# Patient Record
Sex: FEMALE | Race: WHITE | Employment: FULL TIME | URBAN - NONMETROPOLITAN AREA
[De-identification: names, ages, dates, MRNs, and addresses within clinical notes are randomized per-mention and may not be internally consistent; named-entity substitution may affect disease eponyms.]

---
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
## 2022-01-14 ENCOUNTER — HOSPITAL ENCOUNTER (EMERGENCY)
Age: 22
Discharge: HOME | End: 2022-01-14
Attending: FAMILY MEDICINE
Payer: MEDICARE

## 2022-01-14 VITALS
DIASTOLIC BLOOD PRESSURE: 81 MMHG | RESPIRATION RATE: 18 BRPM | SYSTOLIC BLOOD PRESSURE: 126 MMHG | BODY MASS INDEX: 35.34 KG/M2 | TEMPERATURE: 98.3 F | OXYGEN SATURATION: 100 % | HEART RATE: 98 BPM | WEIGHT: 180 LBS | HEIGHT: 60 IN

## 2022-01-14 DIAGNOSIS — U07.1 COVID-19 VIRUS INFECTION: Primary | ICD-10-CM

## 2022-01-14 LAB
FLU A ANTIGEN: NEGATIVE
FLU B ANTIGEN: NEGATIVE
SARS-COV-2, NAAT: DETECTED

## 2022-01-14 PROCEDURE — 87635 SARS-COV-2 COVID-19 AMP PRB: CPT

## 2022-01-14 PROCEDURE — 87804 INFLUENZA ASSAY W/OPTIC: CPT

## 2022-01-14 PROCEDURE — 99284 EMERGENCY DEPT VISIT MOD MDM: CPT

## 2022-01-14 PROCEDURE — 6370000000 HC RX 637 (ALT 250 FOR IP): Performed by: FAMILY MEDICINE

## 2022-01-14 RX ORDER — ONDANSETRON 4 MG/1
4 TABLET, ORALLY DISINTEGRATING ORAL 4 TIMES DAILY PRN
Qty: 20 TABLET | Refills: 0 | Status: SHIPPED | OUTPATIENT
Start: 2022-01-14 | End: 2022-06-06

## 2022-01-14 RX ORDER — ONDANSETRON 4 MG/1
4 TABLET, ORALLY DISINTEGRATING ORAL ONCE
Status: COMPLETED | OUTPATIENT
Start: 2022-01-14 | End: 2022-01-14

## 2022-01-14 RX ADMIN — ONDANSETRON 4 MG: 4 TABLET, ORALLY DISINTEGRATING ORAL at 03:30

## 2022-01-14 ASSESSMENT — PAIN DESCRIPTION - LOCATION: LOCATION: HEAD;THROAT

## 2022-01-14 ASSESSMENT — ENCOUNTER SYMPTOMS
DIARRHEA: 1
VOMITING: 0
ABDOMINAL PAIN: 0
COUGH: 0
SORE THROAT: 0
BACK PAIN: 1
NAUSEA: 1
SHORTNESS OF BREATH: 0
WHEEZING: 0

## 2022-01-14 ASSESSMENT — PAIN DESCRIPTION - PAIN TYPE: TYPE: ACUTE PAIN

## 2022-01-14 ASSESSMENT — PAIN SCALES - GENERAL: PAINLEVEL_OUTOF10: 5

## 2022-01-14 NOTE — Clinical Note
Ronald Barker was seen and treated in our emergency department on 1/14/2022. She may return to work on 01/19/2022. Return to work on 1/19/2022 if asymptomatic for Covid 19 infection but should wear a mask 1/24/2022. If symptoms persist, do not return to work until 1/24/2022. If you have any questions or concerns, please don't hesitate to call.       Talon Lynn MD

## 2022-01-14 NOTE — Clinical Note
Duane Perez was seen and treated in our emergency department on 1/14/2022. She may return to work on 01/19/2022. Return to work on 1/19/2022 if asymptomatic for Covid 19 infection but should wear a mask 1/24/2022. If symptoms persist, do not return to work until 1/24/2022. If you have any questions or concerns, please don't hesitate to call.       Key Weiss MD

## 2022-01-14 NOTE — ED PROVIDER NOTES
[address]/[facility] COMPLAINT       Chief Complaint   Patient presents with    Concern For COVID-19     Pt requests COVID testing, at 2130 1/13/21 pt began to feel \"sinus infection\" and reports a temp of 99.6, wife instructed pt to come to the ED for testing       Nurses Notes reviewed and I agree except as noted in the HPI. HISTORY OF PRESENT ILLNESS    Yovani Lew is a 25 y.o. female who presents for evaluation of possible COVID-19 infection. Patient states that she developed diarrhea 2 days ago. She states that last night she began to experience subjective fevers and chills, nasal congestion, sore throat, nausea, and body aches. Patient took no medication for her symptoms. She states that her temperature was 99.6 and her wife recommended she come in for testing. Patient states that her mother-in-law lives 2 houses down in their household has [place] 19 infection. REVIEW OF SYSTEMS     Review of Systems   Constitutional: Positive for chills and fever. Negative for activity change and appetite change. HENT: Positive for congestion. Negative for ear pain and sore throat. Respiratory: Negative for cough, shortness of breath and wheezing. Cardiovascular: Negative for chest pain and leg swelling. Gastrointestinal: Positive for diarrhea and nausea. Negative for abdominal pain and vomiting. Genitourinary: Negative for dysuria, flank pain and hematuria. Musculoskeletal: Positive for back pain and myalgias. Negative for arthralgias, gait problem and neck pain. Skin: Negative for rash and wound. Neurological: Negative for weakness, light-headedness and headaches. Psychiatric/Behavioral: Negative for agitation and hallucinations. The patient is not nervous/anxious.         PAST MEDICAL HISTORY    has a past medical history of Anxiety, Depression, Fibroadenoma of breast, right, Fibromyalgia, IBS (irritable bowel syndrome), and Stomach ulcer. SURGICAL HISTORY      has a past surgical history that includes Tonsillectomy; [place] tooth extraction; [facility]O [address] RIGHT (2020); and Breast surgery (Right, 2021). CURRENT MEDICATIONS       Previous Medications    ACETAMINOPHEN (TYLENOL) 325 MG TABLET    Take 650 mg by mouth every 6 hours as needed for Pain    CYCLOBENZAPRINE (FLEXERIL) 10 MG TABLET        NAPROXEN (NAPROSYN) 500 MG TABLET           ALLERGIES     has No Known Allergies. FAMILY HISTORY     She indicated that her mother is alive. She indicated that her father is alive. She indicated that her sister is alive. She indicated that her brother is alive. She indicated that her maternal grandmother is alive. She indicated that her maternal grandfather is . She indicated that her paternal grandmother is alive. She indicated that her paternal grandfather is . family history includes Anxiety Disorder in her mother; Asthma in her brother, maternal grandmother, and mother; Bipolar Disorder in her mother; Cancer in her paternal grandfather; Depression in her mother; Diabetes in her paternal grandmother; No Known Problems in her father, maternal grandfather, and sister; Other in her maternal grandmother and mother; Thyroid Disease in her paternal grandmother. SOCIAL HISTORY      reports that she is a non-smoker but has been exposed to tobacco smoke. She has never used smokeless tobacco. She reports previous alcohol use. She reports that she does not use drugs. PHYSICAL EXAM     INITIAL VITALS:  height is 5' (1.524 m) and weight is 180 lb (81.6 kg). Her temporal temperature is 98.3 °F (36.8 °C). Her blood pressure is 126/81 and her pulse is 99. Her respiration is 18 and oxygen saturation is 100%. Physical Exam  Vitals and nursing note reviewed. Constitutional:       General: She is not in acute distress. Appearance: She is well-developed.    HENT:      Head: Normocephalic and atraumatic. Eyes:      General:         Right eye: No discharge. Left eye: No discharge. Conjunctiva/sclera: Conjunctivae normal.   Cardiovascular:      Rate and Rhythm: Normal rate and regular rhythm. Heart sounds: Normal heart sounds. Pulmonary:      Effort: Pulmonary effort is normal.      Breath sounds: Normal breath sounds. Abdominal:      General: Bowel sounds are normal. There is no distension. Palpations: Abdomen is soft. There is no mass. Tenderness: There is no abdominal tenderness. Skin:     General: Skin is warm and dry. Neurological:      General: No focal deficit present. Mental Status: She is alert and oriented to person, place, and time. Psychiatric:         Mood and Affect: Mood normal.         Behavior: Behavior normal.         DIFFERENTIAL DIAGNOSIS:   Covid 19, influenza, viral URI    DIAGNOSTIC RESULTS         LABS:   Labs Reviewed   COVID-19, RAPID - Abnormal; Notable for the following components:       Result Value    SARS-CoV-2, NAAT DETECTED (*)     All other components within normal limits   RAPID INFLUENZA A/B ANTIGENS       DEPARTMENT COURSE:   Vitals:    Vitals:    01/14/22 0256   BP: 126/81   Pulse: 99   Resp: 18   Temp: 98.3 °F (36.8 °C)   TempSrc: Temporal   SpO2: 100%   Weight: 180 lb (81.6 kg)   Height: 5' (1.524 m)       MDM:  Patient presents for evaluation of URI symptoms. Patient tests positive for COVID-19. We discussed diagnosis, home care, quarantining, and expected course. They will return for any symptom worsening or for evaluation of new concerning symptoms.       CRITICAL CARE:   None    CONSULTS:  None    PROCEDURES:  None    FINAL IMPRESSION      1. COVID-19 virus infection          DISPOSITION/PLAN   Discharge    PATIENT REFERRED TO:  Kajal Nicolas  [address] [facility]  [address]  [phone]    Schedule an appointment as soon as possible for a visit in 1 week  As needed      DISCHARGEMEDICATIONS:  New Prescriptions    No medications on file       (Please note that portions of this note were completedwith a voice recognition program.  Efforts were made to edit the dictations but occasionally words are mis-transcribed.)    MD Mary Baker MD  01/14/22 8960

## 2022-06-06 ENCOUNTER — HOSPITAL ENCOUNTER (EMERGENCY)
Age: 22
Discharge: LEFT BEFORE DISCHARGE | End: 2022-06-06
Attending: EMERGENCY MEDICINE
Payer: MEDICARE

## 2022-06-06 ENCOUNTER — APPOINTMENT (OUTPATIENT)
Dept: GENERAL RADIOLOGY | Age: 22
End: 2022-06-06
Payer: MEDICARE

## 2022-06-06 VITALS
HEIGHT: 60 IN | SYSTOLIC BLOOD PRESSURE: 116 MMHG | WEIGHT: 192.6 LBS | RESPIRATION RATE: 16 BRPM | DIASTOLIC BLOOD PRESSURE: 65 MMHG | TEMPERATURE: 97.3 F | HEART RATE: 77 BPM | OXYGEN SATURATION: 100 % | BODY MASS INDEX: 37.81 KG/M2

## 2022-06-06 DIAGNOSIS — L55.9 SUNBURN: ICD-10-CM

## 2022-06-06 DIAGNOSIS — R10.9 NONSPECIFIC ABDOMINAL PAIN: Primary | ICD-10-CM

## 2022-06-06 PROCEDURE — 99282 EMERGENCY DEPT VISIT SF MDM: CPT

## 2022-06-06 ASSESSMENT — ENCOUNTER SYMPTOMS
ABDOMINAL PAIN: 1
EYE PAIN: 0
BLOOD IN STOOL: 0
WHEEZING: 0
SORE THROAT: 0
DIARRHEA: 1
SHORTNESS OF BREATH: 0
EYE DISCHARGE: 0
VOMITING: 0

## 2022-06-06 NOTE — ED PROVIDER NOTES
[address]  [address]  [address]  Phone: [phone]    eMERGENCY dEPARTMENT eNCOUnter           [address] [facility]       Chief Complaint   Patient presents with    Diarrhea    Headache    Sunburn       Nurses Notes reviewed and I agree except as noted in the HPI. HISTORY OF PRESENT ILLNESS    Kaitlin Mcneal is a 25 y.o. female who presented via private vehicle with above-mentioned complaints. She presented with 1 day history of abdominal pain and diarrhea. She describes her pain as mild intermittent cramping which did not radiate anywhere and was not relieved or exacerbated by anything. She denies nausea or vomiting. She had 2 loose stool. She stated that she has history of IBS and she had history of diarrhea and constipation in the past.  She also is complaining of sunburn to her back. REVIEW OF SYSTEMS     Review of Systems   Constitutional: Positive for fatigue. Negative for chills and fever. HENT: Negative for sore throat. Eyes: Negative for pain and discharge. Respiratory: Negative for shortness of breath and wheezing. Cardiovascular: Negative for chest pain and palpitations. Gastrointestinal: Positive for abdominal pain and diarrhea. Negative for blood in stool and vomiting. Genitourinary: Negative for dysuria and hematuria. Musculoskeletal: Negative for neck pain and neck stiffness. Skin:        Sunburn on her upper back   Neurological: Negative for seizures, syncope and headaches. Psychiatric/Behavioral: Negative for confusion. PAST MEDICAL HISTORY    has a past medical history of Anxiety, Depression, Fibroadenoma of breast, right, Fibromyalgia, IBS (irritable bowel syndrome), and Stomach ulcer.     SURGICAL HISTORY      has a past surgical history that includes Tonsillectomy; [place] tooth extraction; ROMEO US GUIDED BREAST BIOPSY W LOC DEVICE 1ST LESION RIGHT (12/14/2020); and Breast surgery (Right, 2021). CURRENT MEDICATIONS       Previous Medications    ACETAMINOPHEN (TYLENOL) 325 MG TABLET    Take 650 mg by mouth every 6 hours as needed for Pain       ALLERGIES     has No Known Allergies. FAMILY HISTORY     She indicated that her mother is alive. She indicated that her father is alive. She indicated that her sister is alive. She indicated that her brother is alive. She indicated that her maternal grandmother is alive. She indicated that her maternal grandfather is . She indicated that her paternal grandmother is alive. She indicated that her paternal grandfather is . family history includes Anxiety Disorder in her mother; Asthma in her brother, maternal grandmother, and mother; Bipolar Disorder in her mother; Cancer in her paternal grandfather; Depression in her mother; Diabetes in her paternal grandmother; No Known Problems in her father, maternal grandfather, and sister; Other in her maternal grandmother and mother; Thyroid Disease in her paternal grandmother. SOCIAL HISTORY      reports that she is a non-smoker but has been exposed to tobacco smoke. She has never used smokeless tobacco. She reports previous alcohol use. She reports that she does not use drugs. PHYSICAL EXAM     INITIAL VITALS:  height is 5' (1.524 m) and weight is 192 lb 9.6 oz (87.4 kg). Her temperature is 97.3 °F (36.3 °C). Her blood pressure is 116/65 and her pulse is 77. Her respiration is 16 and oxygen saturation is 100%. Physical Exam  Vitals and nursing note reviewed. Constitutional:       General: She is not in acute distress. Appearance: She is well-developed. HENT:      Head: Atraumatic. Eyes:      Conjunctiva/sclera: Conjunctivae normal.      Pupils: Pupils are equal, round, and reactive to light. Neck:      Thyroid: No thyromegaly. Vascular: No JVD. Trachea: No tracheal deviation. Cardiovascular:      Rate and Rhythm: Normal rate and regular rhythm.       Heart sounds: No murmur heard. No friction rub. No gallop. Pulmonary:      Effort: Pulmonary effort is normal.      Breath sounds: Normal breath sounds. Abdominal:      General: Bowel sounds are normal.      Palpations: Abdomen is soft. Tenderness: There is no abdominal tenderness. Musculoskeletal:         General: No tenderness. Cervical back: Neck supple. Right lower leg: No edema. Left lower leg: No edema. Skin:     Comments: There is a small area of first-degree sunburn on her central upper back. Neurological:      Mental Status: She is alert. DIFFERENTIAL DIAGNOSIS:       DIAGNOSTIC RESULTS       RADIOLOGY:     LABS:   Labs Reviewed   URINALYSIS WITH REFLEX TO CULTURE   PREGNANCY, URINE       EMERGENCY DEPARTMENT COURSE:   Vitals:    Vitals:    06/06/22 1813   BP: 116/65   Pulse: 77   Resp: 16   Temp: 97.3 °F (36.3 °C)   SpO2: 100%   Weight: 192 lb 9.6 oz (87.4 kg)   Height: 5' (1.524 m)     Patient refused abdominal x-ray and urinalysis stating that she has to leave because her wife is scared of the storm and lightening and she is waiting in the car outside for her. Patient understood the risk of going home without finishing work-up including missing UTI, bowel obstruction, or even surgical condition. FINAL IMPRESSION      1. Nonspecific abdominal pain    2. Sunburn          DISPOSITION/PLAN   Patient signed AMA. She understood that she can come back at any time to complete the work-up.     PATIENT REFERRED TO:    Family doctor  In 2 days        DISCHARGE MEDICATIONS:  New Prescriptions    No medications on file       (Please note that portions of this note were completed with a voice recognition program.  Efforts were made to edit the dictations but occasionally words are mis-transcribed.)    MD Jarrod Juan MD  06/06/22 9817

## 2022-06-06 NOTE — ED TRIAGE NOTES
Pt. Presents ambulatory to ED with c/o sunburn, h/a, diarrhea; pt. Voices she has migraines, \"but headache feels different\", Pt. Denies fever, chills, no vomiting.

## 2022-10-14 ENCOUNTER — HOSPITAL ENCOUNTER (OUTPATIENT)
Dept: WOMENS IMAGING | Age: 22
Discharge: HOME | End: 2022-10-14
Payer: MEDICARE

## 2022-10-14 DIAGNOSIS — N63.10 MASS OF RIGHT BREAST, UNSPECIFIED QUADRANT: ICD-10-CM

## 2022-10-14 PROCEDURE — 76642 ULTRASOUND BREAST LIMITED: CPT

## (undated) DEVICE — SPECIMEN ORIENTATION CHARMS, SIX DISTINCTLY SHAPED STERILE 10MM CHARMS: Brand: MARGINMAP

## (undated) DEVICE — SPONGE LAP W18XL18IN WHT COT 4 PLY FLD STRUNG RADPQ DISP ST

## (undated) DEVICE — APPLICATOR MEDICATED 26 CC SOLUTION HI LT ORNG CHLORAPREP

## (undated) DEVICE — HYPODERMIC SAFETY NEEDLE: Brand: MAGELLAN

## (undated) DEVICE — SHEET, T, LAPAROTOMY, STERILE: Brand: MEDLINE

## (undated) DEVICE — Z INACTIVE SYRINGE BLB IRR

## (undated) DEVICE — ADHESIVE SKIN CLSR 0.7ML TOP DERMBND ADV

## (undated) DEVICE — PENCIL ES L3M BTTN SWCH HOLSTER W/ BLDE ELECTRD EDGE

## (undated) DEVICE — GLOVE SURG SZ 7 L12IN FNGR THK94MIL TRNSLUC YEL LTX HYDRGEL

## (undated) DEVICE — SYRINGE IRRIG 60ML SFT PLIABLE BLB EZ TO GRP 1 HND USE W/

## (undated) DEVICE — YANKAUER,BULB TIP,W/O VENT,RIGID,STERILE: Brand: MEDLINE

## (undated) DEVICE — PACK PROCEDURE SURG PLAS SC MIN SRHP LF

## (undated) DEVICE — SOLUTION IV 1000ML 0.9% SOD CHL PH 5 INJ USP VIAFLX PLAS

## (undated) DEVICE — GLOVE ORANGE PI 8   MSG9080

## (undated) DEVICE — TUBING, SUCTION, 1/4" X 12', STRAIGHT: Brand: MEDLINE